# Patient Record
Sex: FEMALE
[De-identification: names, ages, dates, MRNs, and addresses within clinical notes are randomized per-mention and may not be internally consistent; named-entity substitution may affect disease eponyms.]

---

## 2018-12-01 ENCOUNTER — HOSPITAL ENCOUNTER (EMERGENCY)
Dept: HOSPITAL 14 - H.ER | Age: 53
Discharge: HOME | End: 2018-12-01
Payer: COMMERCIAL

## 2018-12-01 VITALS — BODY MASS INDEX: 26.9 KG/M2

## 2018-12-01 VITALS — SYSTOLIC BLOOD PRESSURE: 129 MMHG | DIASTOLIC BLOOD PRESSURE: 68 MMHG | HEART RATE: 65 BPM

## 2018-12-01 VITALS — TEMPERATURE: 97.2 F

## 2018-12-01 VITALS — RESPIRATION RATE: 18 BRPM | OXYGEN SATURATION: 98 %

## 2018-12-01 DIAGNOSIS — E78.5: ICD-10-CM

## 2018-12-01 DIAGNOSIS — Z86.59: ICD-10-CM

## 2018-12-01 DIAGNOSIS — R42: Primary | ICD-10-CM

## 2018-12-01 DIAGNOSIS — Z79.84: ICD-10-CM

## 2018-12-01 DIAGNOSIS — I10: ICD-10-CM

## 2018-12-01 LAB
ALBUMIN SERPL-MCNC: 3.6 G/DL (ref 3.5–5)
ALBUMIN/GLOB SERPL: 1.1 {RATIO} (ref 1–2.1)
ALT SERPL-CCNC: 53 U/L (ref 9–52)
AST SERPL-CCNC: 25 U/L (ref 14–36)
BASOPHILS # BLD AUTO: 0 K/UL (ref 0–0.2)
BASOPHILS NFR BLD: 0.5 % (ref 0–2)
BUN SERPL-MCNC: 13 MG/DL (ref 7–17)
CALCIUM SERPL-MCNC: 9.3 MG/DL (ref 8.4–10.2)
EOSINOPHIL # BLD AUTO: 0 K/UL (ref 0–0.7)
EOSINOPHIL NFR BLD: 0.6 % (ref 0–4)
ERYTHROCYTE [DISTWIDTH] IN BLOOD BY AUTOMATED COUNT: 13.5 % (ref 11.5–14.5)
GFR NON-AFRICAN AMERICAN: > 60
HGB BLD-MCNC: 14.3 G/DL (ref 12–16)
LYMPHOCYTES # BLD AUTO: 1 K/UL (ref 1–4.3)
LYMPHOCYTES NFR BLD AUTO: 15.1 % (ref 20–40)
MCH RBC QN AUTO: 31.7 PG (ref 27–31)
MCHC RBC AUTO-ENTMCNC: 34.6 G/DL (ref 33–37)
MCV RBC AUTO: 91.4 FL (ref 81–99)
MONOCYTES # BLD: 0.5 K/UL (ref 0–0.8)
MONOCYTES NFR BLD: 7.8 % (ref 0–10)
NEUTROPHILS # BLD: 5.2 K/UL (ref 1.8–7)
NEUTROPHILS NFR BLD AUTO: 76 % (ref 50–75)
NRBC BLD AUTO-RTO: 0.1 % (ref 0–0)
PLATELET # BLD: 256 K/UL (ref 130–400)
PMV BLD AUTO: 7.7 FL (ref 7.2–11.7)
RBC # BLD AUTO: 4.52 MIL/UL (ref 3.8–5.2)
WBC # BLD AUTO: 6.8 K/UL (ref 4.8–10.8)

## 2018-12-01 PROCEDURE — 84484 ASSAY OF TROPONIN QUANT: CPT

## 2018-12-01 PROCEDURE — 80053 COMPREHEN METABOLIC PANEL: CPT

## 2018-12-01 PROCEDURE — 82948 REAGENT STRIP/BLOOD GLUCOSE: CPT

## 2018-12-01 PROCEDURE — 93005 ELECTROCARDIOGRAM TRACING: CPT

## 2018-12-01 PROCEDURE — 99284 EMERGENCY DEPT VISIT MOD MDM: CPT

## 2018-12-01 PROCEDURE — 85025 COMPLETE CBC W/AUTO DIFF WBC: CPT

## 2018-12-01 NOTE — ED PDOC
Syncope/Near Syncope/Dizziness


Time Seen by Provider: 12/01/18 08:18


Chief Complaint (Nursing): Dizziness/Lightheaded


Chief Complaint (Provider): Dizziness/Lightheaded


History Per: Patient


History/Exam Limitations: no limitations


Onset/Duration Of Symptoms: Days (x7)


Current Symptoms Are (Timing): Still Present


Additional Complaint(s): 


52 y/o female with a PMHx of HTN and DM presents to the ED complaining of 

dizziness, onset one week ago. Patient describes symptom as "room-spinning". 

Patient reports symptom is associated with a minimal headache and feeling hot 

and cold. Patient states headache is not the worst headache of her life and is 

not of thunderclap onset.  Patient states she has not been sleeping well and is 

unsure why. Additionally, patient reports of returning from [facility] on Thursday.

Denies cough, chest pain, shortness of breath, dyspnea, neck stiffness, 

numbness, weakness and changes in speech or vision.  No leg pain, hormone use.





PMD: Ottoniel Khanna





Past Medical History


Reviewed: Historical Data, Nursing Documentation, Vital Signs


Vital Signs: 





                                Last Vital Signs











Temp  97 F L  12/01/18 07:43


 


Pulse  78   12/01/18 07:43


 


Resp  18   12/01/18 07:43


 


BP  133/80   12/01/18 07:43


 


Pulse Ox  98   12/01/18 07:43














- Medical History


PMH: Depression, HTN, Hyperlipidemia





- Surgical History


Surgical History: Appendectomy, Cholecystectomy





- Family History


Family History: States: Unknown Family Hx





- Home Medications


Home Medications: 


                                Ambulatory Orders











 Medication  Instructions  Recorded


 


MetFORMIN [glucoPHAGE] 1 tab PO DAILY 04/04/17


 


Ciprofloxacin HCl [Cipro] 500 mg PO BID #20 tab 04/17/17


 


Dicyclomine [Dicyclomine HCl] 10 mg PO TID #10 cap 04/17/17


 


Metronidazole [Flagyl] 500 mg PO TID #30 tablet 04/17/17


 


Meclizine [Meclizine*] 25 mg PO Q12 PRN #10 tab 12/01/18














- Allergies


Allergies/Adverse Reactions: 


                                    Allergies











Allergy/AdvReac Type Severity Reaction Status Date / Time


 


No Known Allergies Allergy   Verified 12/01/18 07:54














Review of Systems


ROS Statement: Except As Marked, All Systems Reviewed And Found Negative


Constitutional: Positive for: Other (feeling hot and cold)


Cardiovascular: Negative for: Chest Pain


Respiratory: Negative for: Cough, Shortness of Breath, Other (Dyspnea)


Musculoskeletal: Negative for: Neck Pain


Neurological: Positive for: Headache (MINIMAL (NOT THUNDERCLAP ONSET OR WORST HA

 OF HER LIFE)), Dizziness (ROOM-SPINNING)





Physical Exam





- Reviewed


Nursing Documentation Reviewed: Yes


Vital Signs Reviewed: Yes





- Physical Exam


Appears: Positive for: No Acute Distress


Head Exam: Positive for: ATRAUMATIC, NORMOCEPHALIC


Skin: Positive for: Normal Color, Warm, Dry


Eye Exam: Positive for: Normal appearance, EOMI, PERRL


ENT: Positive for: Normal ENT Inspection


Neck: Positive for: Normal, Painless ROM


Cardiovascular/Chest: Positive for: Regular Rate, Rhythm.  Negative for: Murmur


Respiratory: Positive for: Normal Breath Sounds.  Negative for: Respiratory 

Distress


Gastrointestinal/Abdominal: Positive for: Normal Exam, Soft.  Negative for: 

Tenderness


Back: Positive for: Normal Inspection.  Negative for: L CVA Tenderness, R CVA 

Tenderness


Extremity: Positive for: Normal ROM.  Negative for: Tenderness, Pedal Edema, 

Deformity


Neurologic/Psych: Positive for: Alert, CNs II-XII, Oriented (x3).  Negative for:

 Motor/Sensory Deficits, Facial Droop





- Laboratory Results


Result Diagrams: 


                                 12/01/18 09:33





                                 12/01/18 09:33


Interpretation Of Abn Labs: no acute





- ECG


ECG: Positive for: Interpreted By Me, Viewed By Me


ECG Rhythm: Positive for: Normal QRS, Normal ST Segment, Sinus Rhythm


O2 Sat by Pulse Oximetry: 98 (RA)


Pulse Ox Interpretation: Normal





- Progress


ED Course And Treament: 





1152:  Stable.  AAOx3.  Pain free.  Tolerated PO.  FU with pcp.  Ambulated with 

no issues.  





Medical Decision Making


Medical Decision Making: 


Time: 0824


Plan:


-- EKG


-- CMP


-- Troponin I


-- CBC with Differentials


-- Antivert 25 mg PO


-- Sodium Chloride IV 1000 mls/hr


-- Tylenol 650 mg PO


-- POC





_______________________________________________________________________


Scribe Attestation:


Documented by Harrison Morillo, acting as a scribe for Dallas Sargent MD.





Provider Scribe Attestation:


All medical record entries made by the Scribe were at my direction and 

personally dictated by me. I have reviewed the chart and agree that the record 

accurately reflects my personal performance of the history, physical exam, 

medical decision making, and the department course for this patient. I have also

 personally directed, reviewed, and agree with the discharge instructions and 

disposition.





Disposition





- Clinical Impression


Clinical Impression: 


 Dizziness








- Patient ED Disposition


Is Patient to be Admitted: No


Counseled Patient/Family Regarding: Studies Performed, Diagnosis, Need For 

Followup, Rx Given





- Disposition


Referrals: 


[facility] [Outside] - 12/03/18


Disposition: Routine/Home


Disposition Time: 11:20


Condition: STABLE


Additional Instructions: 


Return if not better in 3 days. 


Prescriptions: 


Meclizine [Meclizine*] 25 mg PO Q12 PRN #10 tab


 PRN Reason: Dizziness


Instructions:  Dizziness, Nonvertigo, (DC)


Print Language: Bengali

## 2018-12-01 NOTE — CARD
--------------- APPROVED REPORT --------------





Date of service: 12/01/2018



EKG Measurement

Heart [id number]

AL [id number]

[id number]

[id number]

[id number]



<Conclusion>

Normal sinus rhythm

Voltage criteria for left ventricular hypertrophy

Inferior infarct, age undetermined

Abnormal ECG